# Patient Record
Sex: FEMALE | Race: OTHER | HISPANIC OR LATINO | Employment: FULL TIME | URBAN - METROPOLITAN AREA
[De-identification: names, ages, dates, MRNs, and addresses within clinical notes are randomized per-mention and may not be internally consistent; named-entity substitution may affect disease eponyms.]

---

## 2022-01-06 ENCOUNTER — HOSPITAL ENCOUNTER (EMERGENCY)
Facility: HOSPITAL | Age: 37
Discharge: HOME | End: 2022-01-07
Attending: EMERGENCY MEDICINE | Admitting: EMERGENCY MEDICINE

## 2022-01-06 DIAGNOSIS — R10.33 PERIUMBILICAL ABDOMINAL PAIN: Primary | ICD-10-CM

## 2022-01-06 DIAGNOSIS — R11.0 NAUSEA: ICD-10-CM

## 2022-01-06 LAB
BASOPHILS # BLD AUTO: 0.02 THOUSANDS/ΜL (ref 0–0.1)
BASOPHILS NFR BLD AUTO: 0 % (ref 0–1)
BILIRUB UR QL STRIP: NEGATIVE
CLARITY UR: CLEAR
COLOR UR: YELLOW
EOSINOPHIL # BLD AUTO: 0.09 THOUSAND/ΜL (ref 0–0.61)
EOSINOPHIL NFR BLD AUTO: 2 % (ref 0–6)
ERYTHROCYTE [DISTWIDTH] IN BLOOD BY AUTOMATED COUNT: 12.1 % (ref 11.6–15.1)
EXT PREG TEST URINE: NEGATIVE
EXT. CONTROL ED NAV: NORMAL
GLUCOSE UR STRIP-MCNC: NEGATIVE MG/DL
HCT VFR BLD AUTO: 33.8 % (ref 34.8–46.1)
HGB BLD-MCNC: 11.6 G/DL (ref 11.5–15.4)
HGB UR QL STRIP.AUTO: NEGATIVE
IMM GRANULOCYTES # BLD AUTO: 0.02 THOUSAND/UL (ref 0–0.2)
IMM GRANULOCYTES NFR BLD AUTO: 0 % (ref 0–2)
KETONES UR STRIP-MCNC: NEGATIVE MG/DL
LEUKOCYTE ESTERASE UR QL STRIP: ABNORMAL
LYMPHOCYTES # BLD AUTO: 1.56 THOUSANDS/ΜL (ref 0.6–4.47)
LYMPHOCYTES NFR BLD AUTO: 29 % (ref 14–44)
MCH RBC QN AUTO: 32 PG (ref 26.8–34.3)
MCHC RBC AUTO-ENTMCNC: 34.3 G/DL (ref 31.4–37.4)
MCV RBC AUTO: 93 FL (ref 82–98)
MONOCYTES # BLD AUTO: 0.48 THOUSAND/ΜL (ref 0.17–1.22)
MONOCYTES NFR BLD AUTO: 9 % (ref 4–12)
NEUTROPHILS # BLD AUTO: 3.2 THOUSANDS/ΜL (ref 1.85–7.62)
NEUTS SEG NFR BLD AUTO: 60 % (ref 43–75)
NITRITE UR QL STRIP: NEGATIVE
NRBC BLD AUTO-RTO: 0 /100 WBCS
PH UR STRIP.AUTO: 6.5 [PH] (ref 4.5–8)
PLATELET # BLD AUTO: 104 THOUSANDS/UL (ref 149–390)
PMV BLD AUTO: 14.2 FL (ref 8.9–12.7)
PROT UR STRIP-MCNC: NEGATIVE MG/DL
RBC # BLD AUTO: 3.63 MILLION/UL (ref 3.81–5.12)
SP GR UR STRIP.AUTO: 1.02 (ref 1–1.03)
UROBILINOGEN UR QL STRIP.AUTO: 0.2 E.U./DL
WBC # BLD AUTO: 5.37 THOUSAND/UL (ref 4.31–10.16)

## 2022-01-06 PROCEDURE — 83735 ASSAY OF MAGNESIUM: CPT

## 2022-01-06 PROCEDURE — 99284 EMERGENCY DEPT VISIT MOD MDM: CPT

## 2022-01-06 PROCEDURE — 36415 COLL VENOUS BLD VENIPUNCTURE: CPT

## 2022-01-06 PROCEDURE — 80053 COMPREHEN METABOLIC PANEL: CPT

## 2022-01-06 PROCEDURE — 81001 URINALYSIS AUTO W/SCOPE: CPT

## 2022-01-06 PROCEDURE — 85025 COMPLETE CBC W/AUTO DIFF WBC: CPT

## 2022-01-06 PROCEDURE — 83605 ASSAY OF LACTIC ACID: CPT

## 2022-01-06 PROCEDURE — 83690 ASSAY OF LIPASE: CPT

## 2022-01-06 PROCEDURE — 96360 HYDRATION IV INFUSION INIT: CPT

## 2022-01-06 PROCEDURE — 81025 URINE PREGNANCY TEST: CPT

## 2022-01-06 RX ORDER — ACETAMINOPHEN 325 MG/1
650 TABLET ORAL ONCE
Status: COMPLETED | OUTPATIENT
Start: 2022-01-06 | End: 2022-01-06

## 2022-01-06 RX ADMIN — SODIUM CHLORIDE 1000 ML: 0.9 INJECTION, SOLUTION INTRAVENOUS at 23:04

## 2022-01-06 RX ADMIN — ACETAMINOPHEN 650 MG: 325 TABLET, FILM COATED ORAL at 22:59

## 2022-01-07 ENCOUNTER — APPOINTMENT (EMERGENCY)
Dept: CT IMAGING | Facility: HOSPITAL | Age: 37
End: 2022-01-07

## 2022-01-07 VITALS
DIASTOLIC BLOOD PRESSURE: 72 MMHG | SYSTOLIC BLOOD PRESSURE: 114 MMHG | OXYGEN SATURATION: 99 % | WEIGHT: 164.24 LBS | HEART RATE: 75 BPM | RESPIRATION RATE: 16 BRPM | TEMPERATURE: 98.4 F

## 2022-01-07 LAB
ALBUMIN SERPL BCP-MCNC: 3.3 G/DL (ref 3.5–5)
ALP SERPL-CCNC: 40 U/L (ref 46–116)
ALT SERPL W P-5'-P-CCNC: 18 U/L (ref 12–78)
ANION GAP SERPL CALCULATED.3IONS-SCNC: 8 MMOL/L (ref 4–13)
AST SERPL W P-5'-P-CCNC: 12 U/L (ref 5–45)
BACTERIA UR QL AUTO: NORMAL /HPF
BILIRUB SERPL-MCNC: 0.41 MG/DL (ref 0.2–1)
BUN SERPL-MCNC: 6 MG/DL (ref 5–25)
CALCIUM ALBUM COR SERPL-MCNC: 8.7 MG/DL (ref 8.3–10.1)
CALCIUM SERPL-MCNC: 8.1 MG/DL (ref 8.3–10.1)
CHLORIDE SERPL-SCNC: 106 MMOL/L (ref 100–108)
CO2 SERPL-SCNC: 26 MMOL/L (ref 21–32)
CREAT SERPL-MCNC: 0.74 MG/DL (ref 0.6–1.3)
GFR SERPL CREATININE-BSD FRML MDRD: 106 ML/MIN/1.73SQ M
GLUCOSE SERPL-MCNC: 90 MG/DL (ref 65–140)
LACTATE SERPL-SCNC: <0.3 MMOL/L (ref 0.5–2)
LIPASE SERPL-CCNC: 122 U/L (ref 73–393)
MAGNESIUM SERPL-MCNC: 1.9 MG/DL (ref 1.6–2.6)
NON-SQ EPI CELLS URNS QL MICRO: NORMAL /HPF
POTASSIUM SERPL-SCNC: 4.3 MMOL/L (ref 3.5–5.3)
PROT SERPL-MCNC: 6.5 G/DL (ref 6.4–8.2)
RBC #/AREA URNS AUTO: NORMAL /HPF
SODIUM SERPL-SCNC: 140 MMOL/L (ref 136–145)
WBC #/AREA URNS AUTO: NORMAL /HPF

## 2022-01-07 PROCEDURE — 96361 HYDRATE IV INFUSION ADD-ON: CPT

## 2022-01-07 PROCEDURE — 74177 CT ABD & PELVIS W/CONTRAST: CPT

## 2022-01-07 RX ORDER — ONDANSETRON 4 MG/1
4 TABLET, ORALLY DISINTEGRATING ORAL EVERY 6 HOURS PRN
Qty: 20 TABLET | Refills: 0 | Status: SHIPPED | OUTPATIENT
Start: 2022-01-07

## 2022-01-07 RX ORDER — IBUPROFEN 600 MG/1
600 TABLET ORAL EVERY 6 HOURS PRN
Qty: 30 TABLET | Refills: 0 | Status: SHIPPED | OUTPATIENT
Start: 2022-01-07

## 2022-01-07 RX ADMIN — IOHEXOL 100 ML: 350 INJECTION, SOLUTION INTRAVENOUS at 00:33

## 2022-01-07 NOTE — DISCHARGE INSTRUCTIONS
Please return to the ED for any concerns as outlined in the AVS or for any other concerns  Please use the contact information provided for assistance in finding a primary care provider for re-evaluation and further management  Consider MiraLax and/or Colace as needed for constipation  Continue all medications as prescribed, as needed

## 2022-01-07 NOTE — ED PROVIDER NOTES
History  Chief Complaint   Patient presents with    Abdominal Pain     c/o sharp pain in mid belly  c/o nausea  denies urinary symptoms  Patient is a 59-year-old female with no significant past medical history who presents to the ED with a complaint of sharp periumbilical pain for approximately 4 days  Patient denies ever having this pain before and a known inciting event  Patient reports nothing makes the pain better  Reports the pain is exacerbated with bending forward and described as a tight, twisting type of pain rated at a 10/10  Patient reports after the pain subsides it feels like a knot in the area of the pain  Reports the pain sometimes radiates to the back  States she has had a decrease in appetite but has been drinking normally  (+) nausea but denies vomiting and diarrhea  Denies current nausea  Denies fever, chills, cough, congestion, sick contacts, CP, SOB, congestion, dysuria, hematuria, vaginal bleeding, vaginal discharge, pelvic pain, blood in stools, melena, hx of pancreatitis, hx gallbladder issues and any other complaint  Past surgical history significant for tummy tuck 10 years ago and gastric bypass 2 years ago  No issues or complaints since either surgery  None       History reviewed  No pertinent past medical history  History reviewed  No pertinent surgical history  History reviewed  No pertinent family history  I have reviewed and agree with the history as documented  E-Cigarette/Vaping     E-Cigarette/Vaping Substances     Social History     Tobacco Use    Smoking status: Never Smoker    Smokeless tobacco: Never Used   Substance Use Topics    Alcohol use: Not Currently    Drug use: Not Currently       Review of Systems   Constitutional: Negative for chills and fever  HENT: Negative for ear pain and sore throat  Eyes: Negative for pain and visual disturbance  Respiratory: Negative for cough and shortness of breath      Cardiovascular: Negative for chest pain and palpitations  Gastrointestinal: Positive for abdominal pain and nausea  Negative for blood in stool, constipation, diarrhea and vomiting  Genitourinary: Negative for dysuria, hematuria, vaginal bleeding, vaginal discharge and vaginal pain  Musculoskeletal: Negative for arthralgias, back pain and neck pain  Skin: Negative for color change and rash  Neurological: Negative for seizures, syncope, weakness and headaches  Psychiatric/Behavioral: Negative for confusion  All other systems reviewed and are negative  Physical Exam  Physical Exam  Vitals and nursing note reviewed  Constitutional:       General: She is not in acute distress  Appearance: She is well-developed  She is not ill-appearing  HENT:      Head: Normocephalic and atraumatic  Mouth/Throat:      Mouth: Mucous membranes are moist       Pharynx: Oropharynx is clear  Eyes:      Conjunctiva/sclera: Conjunctivae normal    Cardiovascular:      Rate and Rhythm: Normal rate and regular rhythm  Heart sounds: No murmur heard  Pulmonary:      Effort: Pulmonary effort is normal  No respiratory distress  Breath sounds: Normal breath sounds  Abdominal:      Palpations: Abdomen is soft  Tenderness: There is abdominal tenderness in the periumbilical area  There is left CVA tenderness  There is no right CVA tenderness, guarding or rebound  Negative signs include Tariq's sign, Rovsing's sign and McBurney's sign  Hernia: No hernia is present  Musculoskeletal:      Cervical back: Neck supple  Right lower leg: No edema  Left lower leg: No edema  Skin:     General: Skin is warm and dry  Findings: No rash  Neurological:      General: No focal deficit present  Mental Status: She is alert     Psychiatric:         Mood and Affect: Mood normal          Behavior: Behavior normal          Vital Signs  ED Triage Vitals   Temperature Pulse Respirations Blood Pressure SpO2   01/06/22 2041 01/06/22 2041 01/06/22 2041 01/06/22 2041 01/06/22 2041   98 4 °F (36 9 °C) 64 16 139/82 100 %      Temp Source Heart Rate Source Patient Position - Orthostatic VS BP Location FiO2 (%)   01/06/22 2041 01/06/22 2041 01/06/22 2041 01/06/22 2041 --   Oral Monitor Sitting Right arm       Pain Score       01/06/22 2259       7           Vitals:    01/06/22 2041 01/06/22 2306 01/07/22 0143   BP: 139/82 116/80 114/72   Pulse: 64 60 75   Patient Position - Orthostatic VS: Sitting  Lying         Visual Acuity      ED Medications  Medications   acetaminophen (TYLENOL) tablet 650 mg (650 mg Oral Given 1/6/22 2259)   sodium chloride 0 9 % bolus 1,000 mL (0 mL Intravenous Stopped 1/7/22 0113)   iohexol (OMNIPAQUE) 350 MG/ML injection (SINGLE-DOSE) 100 mL (100 mL Intravenous Given 1/7/22 0033)       Diagnostic Studies  Results Reviewed     Procedure Component Value Units Date/Time    Urine Microscopic [[id number]]  (Normal) Collected: 01/06/22 2255    Lab Status: Final result Specimen: Urine, Clean Catch Updated: 01/07/22 0055     RBC, UA 2-4 /hpf      WBC, UA 2-4 /hpf      Epithelial Cells Occasional /hpf      Bacteria, UA Occasional /hpf     Lactic acid [[id number]]  (Abnormal) Collected: 01/06/22 2303    Lab Status: Final result Specimen: Blood from Arm, Left Updated: 01/07/22 0030     LACTIC ACID <0 3 mmol/L     Narrative:      Result may be elevated if tourniquet was used during collection      Magnesium [[id number]]  (Normal) Collected: 01/06/22 2303    Lab Status: Final result Specimen: Blood from Arm, Left Updated: 01/07/22 0025     Magnesium 1 9 mg/dL     Lipase [[id number]]  (Normal) Collected: 01/06/22 2303    Lab Status: Final result Specimen: Blood from Arm, Left Updated: 01/07/22 0025     Lipase 122 u/L     Comprehensive metabolic panel [[id number]]  (Abnormal) Collected: 01/06/22 2303    Lab Status: Final result Specimen: Blood from Arm, Left Updated: 01/07/22 0015     Sodium 140 mmol/L      Potassium 4 3 mmol/L Chloride 106 mmol/L      CO2 26 mmol/L      ANION GAP 8 mmol/L      BUN 6 mg/dL      Creatinine 0 74 mg/dL      Glucose 90 mg/dL      Calcium 8 1 mg/dL      Corrected Calcium 8 7 mg/dL      AST 12 U/L      ALT 18 U/L      Alkaline Phosphatase 40 U/L      Total Protein 6 5 g/dL      Albumin 3 3 g/dL      Total Bilirubin 0 41 mg/dL      eGFR 106 ml/min/1 73sq m     Narrative:      National Kidney Disease Foundation guidelines for Chronic Kidney Disease (CKD):     Stage 1 with normal or high GFR (GFR > 90 mL/min/1 73 square meters)    Stage 2 Mild CKD (GFR = 60-89 mL/min/1 73 square meters)    Stage 3A Moderate CKD (GFR = 45-59 mL/min/1 73 square meters)    Stage 3B Moderate CKD (GFR = 30-44 mL/min/1 73 square meters)    Stage 4 Severe CKD (GFR = 15-29 mL/min/1 73 square meters)    Stage 5 End Stage CKD (GFR <15 mL/min/1 73 square meters)  Note: GFR calculation is accurate only with a steady state creatinine    CBC and differential [[id number]]  (Abnormal) Collected: 01/06/22 2303    Lab Status: Final result Specimen: Blood from Arm, Left Updated: 01/06/22 2344     WBC 5 37 Thousand/uL      RBC 3 63 Million/uL      Hemoglobin 11 6 g/dL      Hematocrit 33 8 %      MCV 93 fL      MCH 32 0 pg      MCHC 34 3 g/dL      RDW 12 1 %      MPV 14 2 fL      Platelets 164 Thousands/uL      nRBC 0 /100 WBCs      Neutrophils Relative 60 %      Immat GRANS % 0 %      Lymphocytes Relative 29 %      Monocytes Relative 9 %      Eosinophils Relative 2 %      Basophils Relative 0 %      Neutrophils Absolute 3 20 Thousands/µL      Immature Grans Absolute 0 02 Thousand/uL      Lymphocytes Absolute 1 56 Thousands/µL      Monocytes Absolute 0 48 Thousand/µL      Eosinophils Absolute 0 09 Thousand/µL      Basophils Absolute 0 02 Thousands/µL     POCT pregnancy, urine [[id number]]  (Normal) Resulted: 01/06/22 2302    Lab Status: Final result Updated: 01/06/22 2302     EXT PREG TEST UR (Ref: Negative) negative     Control VALID    Urine Macroscopic, POC [[id number]]  (Abnormal) Collected: 01/06/22 2255    Lab Status: Final result Specimen: Urine Updated: 01/06/22 2258     Color, UA Yellow     Clarity, UA Clear     pH, UA 6 5     Leukocytes, UA Trace     Nitrite, UA Negative     Protein, UA Negative mg/dl      Glucose, UA Negative mg/dl      Ketones, UA Negative mg/dl      Urobilinogen, UA 0 2 E U /dl      Bilirubin, UA Negative     Blood, UA Negative     Specific Gravity, UA 1 025    Narrative:      CLIN[facility] RESULT                 CT abdomen pelvis with contrast   Final Result by Jan Thompson MD (01/07 0104)      Findings suggestive of constipation  No evidence of bowel obstruction, colitis or diverticulitis  Workstation performed: [id number]                    Procedures  Procedures         ED Course                                             MDM  Number of Diagnoses or Management Options     Amount and/or Complexity of Data Reviewed  Clinical lab tests: ordered and reviewed  Tests in the radiology section of CPT®: ordered and reviewed    Risk of Complications, Morbidity, and/or Mortality  General comments: Pt presenting to the ED with 4 days of sharp periumbilical pain  CT abd and pelvis IMPRESSION:  Findings suggestive of constipation  No evidence of bowel obstruction, colitis or diverticulitis  Labs- grossly unremarkable  Gave specific abdominal pain discharge instructions to the patient  No obvious cause of patients symptoms found on workup  While this most likely means that there is no concerning pathology, there is still the chance that we could be missing something in the CT and blood work  Encouraged patient to followup with primary care doctor and return if symptoms worsen or new symptoms develop  Discussed with the patient who agrees with the workup and plan, understands return precautions and followup instructions  Contact information provided for assistance in findings a PCP     Medications sent to pt's pharmacy  Strict return precautions verbally communicated to the patient as outlined in the AVS   All patient questions and concerns were answered  Patient verbally communicated their understanding and agreement to the above plan  Pt stable at discharge  Disposition  Final diagnoses:   Periumbilical abdominal pain     Time reflects when diagnosis was documented in both MDM as applicable and the Disposition within this note     Time User Action Codes Description Comment    1/7/2022  1:40 AM Patric Roth Core Add [O16 66] Periumbilical abdominal pain     1/7/2022  1:43 AM Echo Estimable Add [R11 0] Nausea       ED Disposition     ED Disposition Condition Date/Time Comment    Discharge Stable Fri Jan 7, 2022  1:40 AM Sravanthi Espinalgarcia discharge to home/self care  Follow-up Information     Follow up With Specialties Details Why Contact Info Additional Information    [employer]fol[employer]  Call  For a primary care provider, For further evaluation [address] [place] [address] Call  For a primary care provider, For further evaluation [address], [facility] [id number]  [address] [place] [address], [address], [address] [place], [place], [address]          Discharge Medication List as of 1/7/2022  1:57 AM      START taking these medications    Details   ibuprofen (MOTRIN) 600 mg tablet Take 1 tablet (600 mg total) by mouth every 6 (six) hours as needed for mild pain, Starting Fri 1/7/2022, Normal      ondansetron (Zofran ODT) 4 mg disintegrating tablet Take 1 tablet (4 mg total) by mouth every 6 (six) hours as needed for nausea or vomiting, Starting Fri 1/7/2022, Normal             No discharge procedures on file      PDMP Review     None          ED Provider  Electronically Signed by           Solomon Almazan PA-C  01/07/22 0839

## 2022-11-28 ENCOUNTER — HOSPITAL ENCOUNTER (EMERGENCY)
Facility: HOSPITAL | Age: 37
Discharge: HOME | End: 2022-11-28
Attending: EMERGENCY MEDICINE

## 2022-11-28 VITALS
OXYGEN SATURATION: 100 % | WEIGHT: 174.6 LBS | TEMPERATURE: 99.9 F | HEART RATE: 86 BPM | SYSTOLIC BLOOD PRESSURE: 114 MMHG | DIASTOLIC BLOOD PRESSURE: 98 MMHG | RESPIRATION RATE: 16 BRPM

## 2022-11-28 DIAGNOSIS — R68.89 FLU-LIKE SYMPTOMS: ICD-10-CM

## 2022-11-28 DIAGNOSIS — B34.9 VIRAL SYNDROME: Primary | ICD-10-CM

## 2022-11-28 DIAGNOSIS — M79.10 MYALGIA: ICD-10-CM

## 2022-11-28 LAB
EXT PREGNANCY TEST URINE: NEGATIVE
EXT. CONTROL: NORMAL
FLUAV RNA RESP QL NAA+PROBE: POSITIVE
FLUBV RNA RESP QL NAA+PROBE: NEGATIVE
RSV RNA RESP QL NAA+PROBE: NEGATIVE
SARS-COV-2 RNA RESP QL NAA+PROBE: NEGATIVE

## 2022-11-28 RX ORDER — KETOROLAC TROMETHAMINE 30 MG/ML
15 INJECTION, SOLUTION INTRAMUSCULAR; INTRAVENOUS ONCE
Status: COMPLETED | OUTPATIENT
Start: 2022-11-28 | End: 2022-11-28

## 2022-11-28 RX ORDER — ACETAMINOPHEN 325 MG/1
975 TABLET ORAL ONCE
Status: COMPLETED | OUTPATIENT
Start: 2022-11-28 | End: 2022-11-28

## 2022-11-28 RX ADMIN — DEXAMETHASONE SODIUM PHOSPHATE 10 MG: 10 INJECTION, SOLUTION INTRAMUSCULAR; INTRAVENOUS at 01:11

## 2022-11-28 RX ADMIN — ACETAMINOPHEN 975 MG: 325 TABLET ORAL at 01:10

## 2022-11-28 RX ADMIN — KETOROLAC TROMETHAMINE 15 MG: 30 INJECTION, SOLUTION INTRAMUSCULAR; INTRAVENOUS at 01:11

## 2022-11-28 NOTE — ED PROVIDER NOTES
History  Chief Complaint   Patient presents with   • Flu Symptoms     Pt had SOB and chest pain Thursday but today she lost her sense of taste, smell, fever, and weakness  No n/v     HPI    29 yo F otherwise healthy presents to ed for cold symptoms  Ongoing uri symptoms for past 4 days  Several family members at home including both daughters have same symptoms  Fevers chills sore throat fatigue, sob intermittently  Tried nyquil without relief  Lost taste and smell, became concerned so came to ER  No chest pain  No other complaints on ros  Not boosted for covid, no flu shot this year either  Prior to Admission Medications   Prescriptions Last Dose Informant Patient Reported? Taking?   ibuprofen (MOTRIN) 600 mg tablet   No No   Sig: Take 1 tablet (600 mg total) by mouth every 6 (six) hours as needed for mild pain   ondansetron (Zofran ODT) 4 mg disintegrating tablet   No No   Sig: Take 1 tablet (4 mg total) by mouth every 6 (six) hours as needed for nausea or vomiting      Facility-Administered Medications: None       Past Medical History:   Diagnosis Date   • Elevated cholesterol/high density lipoprotein ratio        Past Surgical History:   Procedure Laterality Date   • BARIATRIC SURGERY         History reviewed  No pertinent family history  I have reviewed and agree with the history as documented  E-Cigarette/Vaping   • E-Cigarette Use Never User      E-Cigarette/Vaping Substances     Social History     Tobacco Use   • Smoking status: Never   • Smokeless tobacco: Never   Vaping Use   • Vaping Use: Never used   Substance Use Topics   • Alcohol use: Not Currently   • Drug use: Not Currently       Review of Systems   Constitutional: Positive for chills, fatigue and fever  HENT: Positive for congestion and rhinorrhea  Negative for sore throat  Eyes: Negative for redness and visual disturbance  Respiratory: Positive for cough  Cardiovascular: Negative for chest pain     Gastrointestinal: Negative for abdominal pain, diarrhea and nausea  Genitourinary: Negative for difficulty urinating, dysuria and pelvic pain  Musculoskeletal: Negative for back pain  Skin: Negative for rash  Neurological: Negative for syncope, weakness and headaches  All other systems reviewed and are negative  Physical Exam  Physical Exam  Vitals and nursing note reviewed  Constitutional:       General: She is not in acute distress  HENT:      Head: Normocephalic and atraumatic  Mouth/Throat:      Mouth: Mucous membranes are moist       Pharynx: Posterior oropharyngeal erythema present  No oropharyngeal exudate  Comments: Uvula midline  Eyes:      Conjunctiva/sclera: Conjunctivae normal    Cardiovascular:      Rate and Rhythm: Normal rate and regular rhythm  Heart sounds: Normal heart sounds  Pulmonary:      Effort: Pulmonary effort is normal  No respiratory distress  Breath sounds: Normal breath sounds  Comments: No retractions  Abdominal:      General: Bowel sounds are normal       Palpations: Abdomen is soft  Tenderness: There is no abdominal tenderness  Musculoskeletal:         General: Normal range of motion  Cervical back: Normal range of motion  Skin:     General: Skin is warm and dry  Findings: No rash  Neurological:      Mental Status: She is alert and oriented to person, place, and time  Cranial Nerves: No cranial nerve deficit  Sensory: No sensory deficit  Motor: No abnormal muscle tone        Coordination: Coordination normal          Vital Signs  ED Triage Vitals   Temperature Pulse Respirations Blood Pressure SpO2   11/28/22 0027 11/28/22 0027 11/28/22 0027 11/28/22 0027 11/28/22 0027   99 9 °F (37 7 °C) 86 16 114/98 100 %      Temp Source Heart Rate Source Patient Position - Orthostatic VS BP Location FiO2 (%)   11/28/22 0027 11/28/22 0027 11/28/22 0027 11/28/22 0027 --   Tympanic Monitor Sitting Left arm       Pain Score       11/28/22 0110       5 Vitals:    11/28/22 0027   BP: 114/98   Pulse: 86   Patient Position - Orthostatic VS: Sitting         Visual Acuity      ED Medications  Medications   ketorolac (TORADOL) injection 15 mg (15 mg Intramuscular Given 11/28/22 0111)   acetaminophen (TYLENOL) tablet 975 mg (975 mg Oral Given 11/28/22 0110)   dexamethasone oral liquid 10 mg 1 mL (10 mg Oral Given 11/28/22 0111)       Diagnostic Studies  Results Reviewed     Procedure Component Value Units Date/Time    POCT pregnancy, urine [[id number]]  (Normal) Resulted: 11/28/22 0059    Lab Status: Final result Updated: 11/28/22 0059     EXT Preg Test, Ur Negative     Control Valid    FLU/RSV/COVID - if FLU/RSV clinically relevant [[id number]] Collected: 11/28/22 0051    Lab Status: In process Specimen: Nares from Nasopharyngeal Swab Updated: 11/28/22 0057                 No orders to display              Procedures  Procedures         ED Course  ED Course as of 11/28/22 0114 Mon Nov 28, 2022   0109 PREGNANCY TEST URINE: Negative             SBIRT 22yo+    Flowsheet Row Most Recent Value   SBIRT (23 yo +)    In order to provide better care to our patients, we are screening all of our patients for alcohol and drug use  Would it be okay to ask you these screening questions? No Filed at: 11/28/2022 0036                    MDM    Symptomatic treatments  covid testing sent  Well appearing otherwise  Work note given  pcp follow up  The patient was instructed to follow up as documented  Strict return precautions were discussed with the patient and the patient was instructed to return to the emergency department immediately if symptoms worsen  The patient/patient family member acknowledged and were in agreement with plan         Disposition  Final diagnoses:   Viral syndrome   Flu-like symptoms   Myalgia     Time reflects when diagnosis was documented in both MDM as applicable and the Disposition within this note     Time User Action Codes Description Comment 11/28/2022  1:09 AM Job Camera Add [B34 9] Viral syndrome     11/28/2022  1:09 AM Job Camera Add [R68 89] Flu-like symptoms     11/28/2022  1:09 AM Job Camera Add [M79 10] Myalgia       ED Disposition     ED Disposition   Discharge    Condition   Stable    Date/Time   Mon Nov 28, 2022  1:09 AM    Comment   Sravanthi Sandeepenaeileen discharge to home/self care  Follow-up Information     Follow up With Specialties Details Why Contact Info Additional [facility] Schedule an appointment as soon as possible for a visit in 1 week For follow up regarding your symptoms and recheck, To find a primary care doctor [address], [address] [phone]  [address] [place] [address], [address], [address] [place], [place], [address]          Patient's Medications   Discharge Prescriptions    No medications on file       No discharge procedures on file      PDMP Review     None          ED Provider  Electronically Signed by           Magen Etienne MD  11/28/22 1735

## 2022-11-28 NOTE — Clinical Note
Roly Germain was seen and treated in our emergency department on 11/28/2022  No restrictions            Diagnosis: viral syndrome    Sravanthi  may return to work on return date  She may return on this date: 12/01/2022    To quarantine for 10 days from 11/24 if covid positive     If you have any questions or concerns, please don't hesitate to call        Johny Hester MD    ______________________________           _______________          _______________  [facility]                              Date                                Time

## 2022-12-07 ENCOUNTER — HOSPITAL ENCOUNTER (EMERGENCY)
Facility: HOSPITAL | Age: 37
Discharge: HOME | End: 2022-12-07
Attending: EMERGENCY MEDICINE

## 2022-12-07 VITALS
RESPIRATION RATE: 20 BRPM | TEMPERATURE: 98.4 F | WEIGHT: 174.82 LBS | HEART RATE: 89 BPM | SYSTOLIC BLOOD PRESSURE: 144 MMHG | OXYGEN SATURATION: 99 % | DIASTOLIC BLOOD PRESSURE: 87 MMHG

## 2022-12-07 DIAGNOSIS — U07.1 COVID-19: Primary | ICD-10-CM

## 2022-12-07 RX ORDER — PSEUDOEPHEDRINE HCL 30 MG
30 TABLET ORAL EVERY 6 HOURS PRN
Qty: 24 TABLET | Refills: 0 | Status: SHIPPED | OUTPATIENT
Start: 2022-12-07

## 2022-12-07 RX ORDER — GUAIFENESIN AND CODEINE PHOSPHATE 100; 10 MG/5ML; MG/5ML
5 SOLUTION ORAL 3 TIMES DAILY PRN
Qty: 118 ML | Refills: 0 | Status: SHIPPED | OUTPATIENT
Start: 2022-12-07 | End: 2022-12-17

## 2022-12-07 RX ORDER — ALBUTEROL SULFATE 90 UG/1
2 AEROSOL, METERED RESPIRATORY (INHALATION) EVERY 4 HOURS PRN
Qty: 6.7 G | Refills: 0 | Status: SHIPPED | OUTPATIENT
Start: 2022-12-07

## 2022-12-08 NOTE — ED PROVIDER NOTES
History  Chief Complaint   Patient presents with   • Cough     X 2 weeks  Patient tested positive for COVID 1 week ago  Patient is a 68-year-old female who presents with family with 2 weeks of persistent COVID symptoms after testing positive last week  No relief with OTC meds  +congestion, cough, tightness in chest   Non smoker  Prior to Admission Medications   Prescriptions Last Dose Informant Patient Reported? Taking?   ibuprofen (MOTRIN) 600 mg tablet   No No   Sig: Take 1 tablet (600 mg total) by mouth every 6 (six) hours as needed for mild pain   Patient not taking: Reported on 11/28/2022   ondansetron (Zofran ODT) 4 mg disintegrating tablet   No No   Sig: Take 1 tablet (4 mg total) by mouth every 6 (six) hours as needed for nausea or vomiting   Patient not taking: Reported on 11/28/2022      Facility-Administered Medications: None       Past Medical History:   Diagnosis Date   • Elevated cholesterol/high density lipoprotein ratio        Past Surgical History:   Procedure Laterality Date   • BARIATRIC SURGERY         History reviewed  No pertinent family history  I have reviewed and agree with the history as documented  E-Cigarette/Vaping   • E-Cigarette Use Never User      E-Cigarette/Vaping Substances     Social History     Tobacco Use   • Smoking status: Never   • Smokeless tobacco: Never   Vaping Use   • Vaping Use: Never used   Substance Use Topics   • Alcohol use: Not Currently   • Drug use: Not Currently       Review of Systems   Constitutional: Positive for fatigue and fever  HENT: Positive for congestion  Eyes: Negative  Respiratory: Positive for cough and chest tightness  Cardiovascular: Negative  Gastrointestinal: Negative  Endocrine: Negative  Genitourinary: Negative  Musculoskeletal: Negative  Skin: Negative  Allergic/Immunologic: Negative  Neurological: Negative  Hematological: Negative  Psychiatric/Behavioral: Negative      All other systems reviewed and are negative  Physical Exam  Physical Exam  Vitals and nursing note reviewed  Constitutional:       Appearance: Normal appearance  She is normal weight  HENT:      Head: Normocephalic and atraumatic  Right Ear: Tympanic membrane, ear canal and external ear normal       Left Ear: Tympanic membrane, ear canal and external ear normal       Nose: Congestion present  Mouth/Throat:      Mouth: Mucous membranes are moist       Pharynx: Oropharynx is clear  Eyes:      Conjunctiva/sclera: Conjunctivae normal       Pupils: Pupils are equal, round, and reactive to light  Cardiovascular:      Rate and Rhythm: Normal rate and regular rhythm  Pulses: Normal pulses  Heart sounds: Normal heart sounds  Pulmonary:      Effort: Pulmonary effort is normal       Breath sounds: Normal breath sounds  Abdominal:      Palpations: Abdomen is soft  Musculoskeletal:         General: Normal range of motion  Cervical back: Normal range of motion and neck supple  Skin:     General: Skin is warm and dry  Capillary Refill: Capillary refill takes less than 2 seconds  Neurological:      General: No focal deficit present  Mental Status: She is alert and oriented to person, place, and time     Psychiatric:         Mood and Affect: Mood normal          Behavior: Behavior normal          Vital Signs  ED Triage Vitals [12/07/22 2131]   Temperature Pulse Respirations Blood Pressure SpO2   98 4 °F (36 9 °C) 89 20 144/87 99 %      Temp Source Heart Rate Source Patient Position - Orthostatic VS BP Location FiO2 (%)   Oral Monitor Sitting Left arm --      Pain Score       --           Vitals:    12/07/22 2131   BP: 144/87   Pulse: 89   Patient Position - Orthostatic VS: Sitting         Visual Acuity      ED Medications  Medications - No data to display    Diagnostic Studies  Results Reviewed     None                 No orders to display              Procedures  Procedures         ED Course                                             MDM  Number of Diagnoses or Management Options     Amount and/or Complexity of Data Reviewed  Obtain history from someone other than the patient: yes  Review and summarize past medical records: yes        Disposition  Final diagnoses:   COVID-19     Time reflects when diagnosis was documented in both MDM as applicable and the Disposition within this note     Time User Action Codes Description Comment    12/7/2022  9:53 PM Zuleima Baldwin Félix [U07 1] COVID-19       ED Disposition     ED Disposition   Discharge    Condition   Stable    Date/Time   Wed Dec 7, 2022  9:53 PM    Comment   Sravanthi Syed discharge to home/self care                 Follow-up Information     Follow up With Specialties Details Why Contact Info Additional [address]   [address], [address] [facility] [id number]  [address], [address], [address] [place], [place], [address]          Discharge Medication List as of 12/7/2022  9:54 PM      START taking these medications    Details   albuterol (Ventolin HFA) 90 mcg/act inhaler Inhale 2 puffs every 4 (four) hours as needed for wheezing, Starting Wed 12/7/2022, Normal      guaifenesin-codeine (GUAIFENESIN AC) 100-10 MG/5ML liquid Take 5 mL by mouth 3 (three) times a day as needed for cough for up to 10 days, Starting Wed 12/7/2022, Until Sat 12/17/2022 at 2359, Normal      pseudoephedrine (SUDAFED) 30 mg tablet Take 1 tablet (30 mg total) by mouth every 6 (six) hours as needed for congestion, Starting Wed 12/7/2022, Normal         CONTINUE these medications which have NOT CHANGED    Details   ibuprofen (MOTRIN) 600 mg tablet Take 1 tablet (600 mg total) by mouth every 6 (six) hours as needed for mild pain, Starting Fri 1/7/2022, Normal      ondansetron (Zofran ODT) 4 mg disintegrating tablet Take 1 tablet (4 mg total) by mouth every 6 (six) hours as needed for nausea or vomiting, Starting Fri 1/7/2022, Normal             No discharge procedures on file      PDMP Review     None          ED Provider  Electronically Signed by           Nargis Jama MD  12/07/22 5697

## 2022-12-09 ENCOUNTER — HOSPITAL ENCOUNTER (EMERGENCY)
Facility: HOSPITAL | Age: 37
Discharge: HOME | End: 2022-12-09
Attending: EMERGENCY MEDICINE | Admitting: EMERGENCY MEDICINE

## 2022-12-09 VITALS
HEART RATE: 89 BPM | OXYGEN SATURATION: 99 % | DIASTOLIC BLOOD PRESSURE: 88 MMHG | RESPIRATION RATE: 20 BRPM | SYSTOLIC BLOOD PRESSURE: 130 MMHG | TEMPERATURE: 97.6 F | WEIGHT: 179 LBS

## 2022-12-09 DIAGNOSIS — U07.1 COVID-19: Primary | ICD-10-CM

## 2022-12-09 NOTE — Clinical Note
Rosemary Dupree was seen and treated in our emergency department on 12/9/2022  Diagnosis:     Sravanthi    She may return on this date:     Please continue to allow the patient to remain home from work as she continues to have symptoms related to her infection  If you have any questions or concerns, please don't hesitate to call        Barbara Guerrero DO    ______________________________           _______________          _______________  Hospital Representative                              Date                                Time

## 2022-12-10 NOTE — ED PROVIDER NOTES
History  Chief Complaint   Patient presents with   • Cough     Reports having covid - continues with cough, fever off and on      72-year-old female presents requesting another note to cover her for work  She was diagnosed with COVID 2 weeks ago and because of ongoing symptoms would like to remain home  She continues to have runny nose and congestion along with a cough that is disrupting her sleep  Because of the duration of her sickness, her workplace is requiring an additional note to cover her  They have attempted to establish across the street and have been unable to get through to anyone already received any call backs  Cough  Cough characteristics:  Productive  Sputum characteristics:  Nondescript  Severity:  Moderate  Onset quality:  Gradual  Timing:  Constant  Progression:  Partially resolved  Context comment:  + covid diagnosis  Relieved by:  Nothing  Worsened by:  Nothing  Associated symptoms: rhinorrhea and sinus congestion        Prior to Admission Medications   Prescriptions Last Dose Informant Patient Reported? Taking?    albuterol (Ventolin HFA) 90 mcg/act inhaler   No No   Sig: Inhale 2 puffs every 4 (four) hours as needed for wheezing   guaifenesin-codeine (GUAIFENESIN AC) 100-10 MG/5ML liquid   No No   Sig: Take 5 mL by mouth 3 (three) times a day as needed for cough for up to 10 days   ibuprofen (MOTRIN) 600 mg tablet   No No   Sig: Take 1 tablet (600 mg total) by mouth every 6 (six) hours as needed for mild pain   Patient not taking: Reported on 11/28/2022   ondansetron (Zofran ODT) 4 mg disintegrating tablet   No No   Sig: Take 1 tablet (4 mg total) by mouth every 6 (six) hours as needed for nausea or vomiting   Patient not taking: Reported on 11/28/2022   pseudoephedrine (SUDAFED) 30 mg tablet   No No   Sig: Take 1 tablet (30 mg total) by mouth every 6 (six) hours as needed for congestion      Facility-Administered Medications: None       Past Medical History:   Diagnosis Date   • Elevated cholesterol/high density lipoprotein ratio        Past Surgical History:   Procedure Laterality Date   • BARIATRIC SURGERY         No family history on file  I have reviewed and agree with the history as documented  E-Cigarette/Vaping   • E-Cigarette Use Never User      E-Cigarette/Vaping Substances     Social History     Tobacco Use   • Smoking status: Never   • Smokeless tobacco: Never   Vaping Use   • Vaping Use: Never used   Substance Use Topics   • Alcohol use: Not Currently   • Drug use: Not Currently       Review of Systems   HENT: Positive for rhinorrhea  Respiratory: Positive for cough  All other systems reviewed and are negative  Physical Exam  Physical Exam  Vitals and nursing note reviewed  Constitutional:       General: She is not in acute distress  Appearance: Normal appearance  She is well-developed  She is ill-appearing  She is not toxic-appearing or diaphoretic  HENT:      Head: Normocephalic and atraumatic  Right Ear: External ear normal       Left Ear: External ear normal       Nose: Congestion present  Mouth/Throat:      Mouth: Mucous membranes are moist       Pharynx: Oropharynx is clear  Eyes:      Conjunctiva/sclera: Conjunctivae normal       Pupils: Pupils are equal, round, and reactive to light  Cardiovascular:      Rate and Rhythm: Normal rate and regular rhythm  Heart sounds: Normal heart sounds  Pulmonary:      Effort: Pulmonary effort is normal  No respiratory distress  Breath sounds: Normal breath sounds  No wheezing  Abdominal:      General: Bowel sounds are normal       Palpations: Abdomen is soft  Tenderness: There is no abdominal tenderness  There is no guarding  Musculoskeletal:         General: No tenderness or deformity  Cervical back: Normal range of motion and neck supple  No rigidity  Skin:     General: Skin is warm and dry  Capillary Refill: Capillary refill takes less than 2 seconds  Findings: No rash  Neurological:      General: No focal deficit present  Mental Status: She is alert and oriented to person, place, and time  Psychiatric:         Mood and Affect: Mood normal          Behavior: Behavior normal          Vital Signs  ED Triage Vitals [12/09/22 2345]   Temperature Pulse Respirations Blood Pressure SpO2   97 6 °F (36 4 °C) 89 20 130/88 99 %      Temp Source Heart Rate Source Patient Position - Orthostatic VS BP Location FiO2 (%)   Tympanic Monitor Sitting Left arm --      Pain Score       --           Vitals:    12/09/22 2345   BP: 130/88   Pulse: 89   Patient Position - Orthostatic VS: Sitting         Visual Acuity      ED Medications  Medications - No data to display    Diagnostic Studies  Results Reviewed     None                 No orders to display              Procedures  Procedures         ED Course                               SBIRT 20yo+    Flowsheet Row Most Recent Value   SBIRT (25 yo +)    In order to provide better care to our patients, we are screening all of our patients for alcohol and drug use  Would it be okay to ask you these screening questions? No Filed at: 12/09/2022 2349                    MDM    Disposition  Final diagnoses:   COVID-19     Time reflects when diagnosis was documented in both MDM as applicable and the Disposition within this note     Time User Action Codes Description Comment    12/9/2022 11:42 PM Lyle Figueroa Add [U07 1] COVID-19       ED Disposition     ED Disposition   Discharge    Condition   Stable    Date/Time   Fri Dec 9, 2022 11:42 PM    Comment   Sravanthi Shepherd discharge to home/self care  Follow-up Information    None         Patient's Medications   Discharge Prescriptions    No medications on file       No discharge procedures on file      PDMP Review     None          ED Provider  Electronically Signed by           Jameson Santa DO  12/09/22 7393

## 2024-10-29 ENCOUNTER — EMERGENCY (EMERGENCY)
Facility: HOSPITAL | Age: 39
LOS: 1 days | Discharge: HOME | End: 2024-10-29
Attending: EMERGENCY MEDICINE
Payer: COMMERCIAL

## 2024-10-29 VITALS
WEIGHT: 186.51 LBS | HEIGHT: 66 IN | HEART RATE: 82 BPM | SYSTOLIC BLOOD PRESSURE: 134 MMHG | RESPIRATION RATE: 20 BRPM | TEMPERATURE: 98 F | DIASTOLIC BLOOD PRESSURE: 90 MMHG | OXYGEN SATURATION: 100 %

## 2024-10-29 DIAGNOSIS — Z87.19 PERSONAL HISTORY OF OTHER DISEASES OF THE DIGESTIVE SYSTEM: ICD-10-CM

## 2024-10-29 DIAGNOSIS — R10.33 PERIUMBILICAL PAIN: ICD-10-CM

## 2024-10-29 DIAGNOSIS — R10.13 EPIGASTRIC PAIN: ICD-10-CM

## 2024-10-29 DIAGNOSIS — K21.9 GASTRO-ESOPHAGEAL REFLUX DISEASE WITHOUT ESOPHAGITIS: ICD-10-CM

## 2024-10-29 DIAGNOSIS — K44.9 DIAPHRAGMATIC HERNIA WITHOUT OBSTRUCTION OR GANGRENE: ICD-10-CM

## 2024-10-29 DIAGNOSIS — Z98.84 BARIATRIC SURGERY STATUS: ICD-10-CM

## 2024-10-29 LAB
ALBUMIN SERPL ELPH-MCNC: 3.9 G/DL — SIGNIFICANT CHANGE UP (ref 3.3–5.2)
ALP SERPL-CCNC: 57 U/L — SIGNIFICANT CHANGE UP (ref 40–120)
ALT FLD-CCNC: 11 U/L — SIGNIFICANT CHANGE UP
ANION GAP SERPL CALC-SCNC: 12 MMOL/L — SIGNIFICANT CHANGE UP (ref 5–17)
APTT BLD: 30.5 SEC — SIGNIFICANT CHANGE UP (ref 24.5–35.6)
AST SERPL-CCNC: 19 U/L — SIGNIFICANT CHANGE UP
BASOPHILS # BLD AUTO: 0.03 K/UL — SIGNIFICANT CHANGE UP (ref 0–0.2)
BASOPHILS NFR BLD AUTO: 0.4 % — SIGNIFICANT CHANGE UP (ref 0–2)
BILIRUB SERPL-MCNC: 0.2 MG/DL — LOW (ref 0.4–2)
BLD GP AB SCN SERPL QL: SIGNIFICANT CHANGE UP
BUN SERPL-MCNC: 9.8 MG/DL — SIGNIFICANT CHANGE UP (ref 8–20)
CALCIUM SERPL-MCNC: 8.5 MG/DL — SIGNIFICANT CHANGE UP (ref 8.4–10.5)
CHLORIDE SERPL-SCNC: 104 MMOL/L — SIGNIFICANT CHANGE UP (ref 96–108)
CO2 SERPL-SCNC: 22 MMOL/L — SIGNIFICANT CHANGE UP (ref 22–29)
CREAT SERPL-MCNC: 0.8 MG/DL — SIGNIFICANT CHANGE UP (ref 0.5–1.3)
EGFR: 97 ML/MIN/1.73M2 — SIGNIFICANT CHANGE UP
EOSINOPHIL # BLD AUTO: 0.07 K/UL — SIGNIFICANT CHANGE UP (ref 0–0.5)
EOSINOPHIL NFR BLD AUTO: 1 % — SIGNIFICANT CHANGE UP (ref 0–6)
GLUCOSE SERPL-MCNC: 101 MG/DL — HIGH (ref 70–99)
HCG SERPL-ACNC: <4 MIU/ML — SIGNIFICANT CHANGE UP
HCT VFR BLD CALC: 33.5 % — LOW (ref 34.5–45)
HGB BLD-MCNC: 11.2 G/DL — LOW (ref 11.5–15.5)
HIV 1 & 2 AB SERPL IA.RAPID: SIGNIFICANT CHANGE UP
IMM GRANULOCYTES NFR BLD AUTO: 0.3 % — SIGNIFICANT CHANGE UP (ref 0–0.9)
INR BLD: 1.07 RATIO — SIGNIFICANT CHANGE UP (ref 0.85–1.16)
LIDOCAIN IGE QN: 40 U/L — SIGNIFICANT CHANGE UP (ref 22–51)
LYMPHOCYTES # BLD AUTO: 1.67 K/UL — SIGNIFICANT CHANGE UP (ref 1–3.3)
LYMPHOCYTES # BLD AUTO: 24 % — SIGNIFICANT CHANGE UP (ref 13–44)
MCHC RBC-ENTMCNC: 30.4 PG — SIGNIFICANT CHANGE UP (ref 27–34)
MCHC RBC-ENTMCNC: 33.4 G/DL — SIGNIFICANT CHANGE UP (ref 32–36)
MCV RBC AUTO: 90.8 FL — SIGNIFICANT CHANGE UP (ref 80–100)
MONOCYTES # BLD AUTO: 0.37 K/UL — SIGNIFICANT CHANGE UP (ref 0–0.9)
MONOCYTES NFR BLD AUTO: 5.3 % — SIGNIFICANT CHANGE UP (ref 2–14)
NEUTROPHILS # BLD AUTO: 4.79 K/UL — SIGNIFICANT CHANGE UP (ref 1.8–7.4)
NEUTROPHILS NFR BLD AUTO: 69 % — SIGNIFICANT CHANGE UP (ref 43–77)
PLATELET # BLD AUTO: 144 K/UL — LOW (ref 150–400)
POTASSIUM SERPL-MCNC: 4.1 MMOL/L — SIGNIFICANT CHANGE UP (ref 3.5–5.3)
POTASSIUM SERPL-SCNC: 4.1 MMOL/L — SIGNIFICANT CHANGE UP (ref 3.5–5.3)
PROT SERPL-MCNC: 7 G/DL — SIGNIFICANT CHANGE UP (ref 6.6–8.7)
PROTHROM AB SERPL-ACNC: 12.4 SEC — SIGNIFICANT CHANGE UP (ref 9.9–13.4)
RBC # BLD: 3.69 M/UL — LOW (ref 3.8–5.2)
RBC # FLD: 12.4 % — SIGNIFICANT CHANGE UP (ref 10.3–14.5)
SODIUM SERPL-SCNC: 138 MMOL/L — SIGNIFICANT CHANGE UP (ref 135–145)
WBC # BLD: 6.95 K/UL — SIGNIFICANT CHANGE UP (ref 3.8–10.5)
WBC # FLD AUTO: 6.95 K/UL — SIGNIFICANT CHANGE UP (ref 3.8–10.5)

## 2024-10-29 PROCEDURE — 86803 HEPATITIS C AB TEST: CPT

## 2024-10-29 PROCEDURE — 86703 HIV-1/HIV-2 1 RESULT ANTBDY: CPT

## 2024-10-29 PROCEDURE — 85025 COMPLETE CBC W/AUTO DIFF WBC: CPT

## 2024-10-29 PROCEDURE — 74177 CT ABD & PELVIS W/CONTRAST: CPT | Mod: 26,MC

## 2024-10-29 PROCEDURE — 83690 ASSAY OF LIPASE: CPT

## 2024-10-29 PROCEDURE — 86850 RBC ANTIBODY SCREEN: CPT

## 2024-10-29 PROCEDURE — 99284 EMERGENCY DEPT VISIT MOD MDM: CPT

## 2024-10-29 PROCEDURE — 36415 COLL VENOUS BLD VENIPUNCTURE: CPT

## 2024-10-29 PROCEDURE — T1013: CPT

## 2024-10-29 PROCEDURE — 84702 CHORIONIC GONADOTROPIN TEST: CPT

## 2024-10-29 PROCEDURE — 86900 BLOOD TYPING SEROLOGIC ABO: CPT

## 2024-10-29 PROCEDURE — 80053 COMPREHEN METABOLIC PANEL: CPT

## 2024-10-29 PROCEDURE — 99285 EMERGENCY DEPT VISIT HI MDM: CPT

## 2024-10-29 PROCEDURE — 85610 PROTHROMBIN TIME: CPT

## 2024-10-29 PROCEDURE — 86901 BLOOD TYPING SEROLOGIC RH(D): CPT

## 2024-10-29 PROCEDURE — 85730 THROMBOPLASTIN TIME PARTIAL: CPT

## 2024-10-29 PROCEDURE — 74177 CT ABD & PELVIS W/CONTRAST: CPT | Mod: MC

## 2024-10-29 RX ORDER — SODIUM CHLORIDE 9 MG/ML
1000 INJECTION, SOLUTION INTRAMUSCULAR; INTRAVENOUS; SUBCUTANEOUS ONCE
Refills: 0 | Status: COMPLETED | OUTPATIENT
Start: 2024-10-29 | End: 2024-10-29

## 2024-10-29 RX ORDER — MORPHINE SULFATE 30 MG/1
4 TABLET, EXTENDED RELEASE ORAL ONCE
Refills: 0 | Status: DISCONTINUED | OUTPATIENT
Start: 2024-10-29 | End: 2024-10-29

## 2024-10-29 RX ORDER — IOHEXOL 300 MG/ML
30 INJECTION, SOLUTION INTRAVENOUS ONCE
Refills: 0 | Status: COMPLETED | OUTPATIENT
Start: 2024-10-29 | End: 2024-10-29

## 2024-10-29 RX ADMIN — SODIUM CHLORIDE 1000 MILLILITER(S): 9 INJECTION, SOLUTION INTRAMUSCULAR; INTRAVENOUS; SUBCUTANEOUS at 18:53

## 2024-10-29 RX ADMIN — IOHEXOL 30 MILLILITER(S): 300 INJECTION, SOLUTION INTRAVENOUS at 18:53

## 2024-10-29 NOTE — ED PROVIDER NOTE - OBJECTIVE STATEMENT
39-year-old female past medical history of gastritis status post gastric sleeve approximately 4 years ago and history of umbilical hernia presents emergency department complaining of increasing pain to the umbilical area.  Patient states she is always had some discomfort in the area but over the past 1 week it is becoming increasingly painful.  Denies any fever, chills, nausea, vomiting, diarrhea, or urinary symptoms.

## 2024-10-29 NOTE — ED ADULT NURSE REASSESSMENT NOTE - NS ED NURSE REASSESS COMMENT FT1
Pt received from day RN AG. Pt is resting in stretcher comfortably at this time, no apparent distress noted at this time. Pt safety maintained. Pt denies any complaints at this time. Pt denies any CP, SOB, HA, dizziness, vision changes, N/V/D. Resp even and unlabored. NAD present. Pt pending CT.

## 2024-10-29 NOTE — ED PROVIDER NOTE - CLINICAL SUMMARY MEDICAL DECISION MAKING FREE TEXT BOX
CT without acute pathology  Surgery consulted, recommending PPI, out patient follow up      Pt reassessed, pt feeling better at this time, vss, pt able to walk, talk and vocalized plan of action. Discussed in depth and explained to pt in depth the next steps that need to be taken including proper follow up with PCP or specialists. All incidental findings were discussed with pt as well. Pt verbalized their concerns and all questions were answered. Pt understands dispo and wants discharge. Given good instructions when to return to ED, strict return precautions and importance of f/u.

## 2024-10-29 NOTE — ED ADULT NURSE NOTE - OBJECTIVE STATEMENT
Pt is a 39YOF who is here with abdominal pain, pt states that she was recently in [place] and they diagnosed her with gastritis, pt has a hx of a gastric sleeve and umbilical hernia, pt has pain on palpation near the umbilicus and in the upper right near midline abdominal area, pt denies any changes to her bowel or urinary pattern, fevers, chills, vomiting, endorses nausea at time.

## 2024-10-29 NOTE — ED PROVIDER NOTE - PATIENT PORTAL LINK FT
You can access the FollowMyHealth Patient Portal offered by [facility] by registering at the following website: [email or web address]. By joining [employer]’s FollowMyHealth portal, you will also be able to view your health information using other applications (apps) compatible with our system.

## 2024-10-29 NOTE — ED ADULT NURSE NOTE - TEMPLATE

## 2024-10-29 NOTE — ED PROVIDER NOTE - ATTENDING APP SHARED VISIT CONTRIBUTION OF CARE
39-year-old female past medical history of gastritis status post gastric sleeve approximately 4 years ago and history of umbilical hernia presents emergency department complaining of increasing pain to the umbilical area.  Patient states she is always had some discomfort in the area but over the past 1 week it is becoming increasingly painful.  Denies any fever, chills, nausea, vomiting, diarrhea, or urinary symptoms.    Mild tenderness on examination.  Given history will obtain labs, pain control, CT abdomen pelvis and reevaluate.

## 2024-10-30 LAB
HCV AB S/CO SERPL IA: 0.1 S/CO — SIGNIFICANT CHANGE UP (ref 0–0.99)
HCV AB SERPL-IMP: SIGNIFICANT CHANGE UP

## 2024-10-30 RX ORDER — PANTOPRAZOLE SODIUM 40 MG/1
1 TABLET, DELAYED RELEASE ORAL
Qty: 14 | Refills: 0
Start: 2024-10-30 | End: 2024-11-12

## 2024-10-30 RX ORDER — FAMOTIDINE 10 MG/ML
1 INJECTION INTRAVENOUS
Qty: 14 | Refills: 0
Start: 2024-10-30 | End: 2024-11-12

## 2024-10-30 NOTE — CONSULT NOTE ADULT - ASSESSMENT
ASSESSMENT: Patient is a 39y old female with a PSHx of sleeve gastrectomy and a hiatal hernia, presents with GERD sxs.    PLAN:    - Recommend daily PPI  - Recommend outpatient follow up with Dr. Valencia or her original bariatric surgeon to discuss hiatal hernia management    Discussed with Dr. Valencia

## 2024-10-30 NOTE — CONSULT NOTE ADULT - SUBJECTIVE AND OBJECTIVE BOX
BARIATRIC SURGERY CONSULT     HPI: 39y Female with a PMHx of sleeve gastrectomy in [place] 4 years ago, presents with periumbilical/epigastric abdominal pain that has been present since her sleeve but acutely worsened this past week.  She describes the pain as a twisting sensation.  It is improved with food, she cannot identify any worsening factors.  Reports a hx of GERD and that this feels similar.      ROS: 10-system review is otherwise negative except HPI above.      PAST MEDICAL & SURGICAL HISTORY:    FAMILY HISTORY:    Family history not pertinent as reviewed with the patient.    SOCIAL HISTORY:  Denies any toxic habits    ALLERGIES: NKA No Known Allergies      HOME MEDICATIONS: ***  Home Medications:      --------------------------------------------------------------------------------------------    PHYSICAL EXAM:   General: NAD, Lying in bed comfortably  Neuro: A+Ox3  HEENT: EOMI  Cardio: RRR  Resp: Non labored breathing on RA  GI/Abd: Soft, TTP in the periumbilical region and epigastrium, nondistended, small umbilical hernia with no contents    --------------------------------------------------------------------------------------------    LABS                 11.2   6.95   )----------(  144       ( 29 Oct 2024 18:57 )               33.5      138    |  104    |  9.8    ----------------------------<  101        ( 29 Oct 2024 18:57 )  4.1     |  22.0   |  0.80     Ca    8.5        ( 29 Oct 2024 18:57 )    TPro  7.0    /  Alb  3.9    /  TBili  0.2    /  DBili  x      /  AST  19     /  ALT  11     /  AlkPhos  57     ( 29 Oct 2024 18:57 )    LIVER FUNCTIONS - ( 29 Oct 2024 18:57 )  Alb: 3.9 g/dL / Pro: 7.0 g/dL / ALK PHOS: 57 U/L / ALT: 11 U/L / AST: 19 U/L / GGT: x           PT/INR -  12.4 sec / 1.07 ratio   ( 29 Oct 2024 18:57 )       PTT -  30.5 sec   ( 29 Oct 2024 18:57 )  CAPILLARY BLOOD GLUCOSE        Urinalysis Basic - ( 29 Oct 2024 18:57 )    Color: x / Appearance: x / SG: x / pH: x  Gluc: 101 mg/dL / Ketone: x  / Bili: x / Urobili: x   Blood: x / Protein: x / Nitrite: x   Leuk Esterase: x / RBC: x / WBC x   Sq Epi: x / Non Sq Epi: x / Bacteria: x          --------------------------------------------------------------------------------------------  IMAGING  < from: CT Abdomen and Pelvis w/ Oral Cont and w/ IV Cont (10.29.24 @ 23:54) >    ACC: [id number] EXAM:  CT ABDOMEN AND PELVIS OC IC   ORDERED BY: MARTIN BRICENO     PROCEDURE DATE:  10/29/2024          INTERPRETATION:  CLINICAL INFORMATION: Abdominal pain. History of gastric   sleeve four years ago and history of umbilical hernia.    COMPARISON: None.    CONTRAST/COMPLICATIONS:  IV Contrast: Omnipaque 350  90 cc administered   10 cc discarded  Oral Contrast: Omnipaque 300   Fruit 2o  Complications: None reported at time of study completion    PROCEDURE:  CT of the Abdomen and Pelvis was performed.  Sagittal and coronal reformats were performed.    FINDINGS:  LOWER CHEST: Left lower lobe calcified granuloma. Partially visualized   bilateral breast implants.    LIVER: Within normal limits.  BILE DUCTS: Normal caliber.  GALLBLADDER: Within normal limits.  SPLEEN: Within normal limits.  PANCREAS: Within normal limits.  ADRENALS: Within normal limits.  KIDNEYS/URETERS: Within normal limits.    BLADDER: Within normal limits.  REPRODUCTIVE ORGANS: Retroverted uterus. Thickened endometrium measuring   up to 13 mm which likely correlates with the phase of the patient's   menstrual cycle. Ovaries within normal limits.    BOWEL: Sleeve gastrectomy. Small hiatal hernia. No bowel obstruction or   inflammation. Appendix isnormal.  PERITONEUM/RETROPERITONEUM: Within normal limits.  VESSELS: Within normal limits.  LYMPH NODES: No lymphadenopathy.  ABDOMINAL WALL: Subcutaneous inflammatory change in the abdominal wall   likely post procedural in nature.  BONES: Within normal limits.    IMPRESSION:  No bowel obstruction or inflammation.        --- End of Report ---            MANSI SEGUNDO MD; Attending Radiologist  This document has been electronically signed. Oct 30 2024  1:05AM    < end of copied text >